# Patient Record
(demographics unavailable — no encounter records)

---

## 2025-06-13 NOTE — DISCUSSION/SUMMARY
[de-identified] : Patient I discussed the underlying etiology recurrent discomfort.  Utilizing a model we reviewed the part anatomy lumbar spine patient was able to appreciate her exiting nerve root pressure of the lumbar spine through combination of disc bulging and facet arthritis can cause pressure on the nerve in similar symptoms to her complaints.  We talked about how this is usually self-limiting but noted help speed up the rate of recovery patient and I have decided to use a Medrol Dosepak reasonable risk and benefits of medication were discussed in detail including potential side effects we also reviewed the patient's current medication profile appears to be no relative current contraindication to his limited use.  Patient also be given a appointment to see our pain management specialist in 7 to 10 days if not thoroughly improved for evaluation of possible epidural injection.  This consultation was 30 minutes exclusive teaching time and separately billed procedures.

## 2025-06-13 NOTE — PHYSICAL EXAM
[de-identified] : Patient is full passive internal/external rotation of left hip with no restriction cam walker pain.

## 2025-06-13 NOTE — HISTORY OF PRESENT ILLNESS
[de-identified] : Patient that I have not seen in 5 years returns today with a complaint of pain in the lower back the radiating pain into her hip and thigh.  She had previously been seen about 5 years ago for similar findings of lumbar radiculopathy did well with a steroid taper.  Patient states ongoing for about 2 months recalls no specific accident injury or initiating traumatic event currently taking no medications denies any change in bowel or bladder habits.  She also denies any obvious weakness or numb  Patient also states has been going to physical therapy for this issue twice a week for about 5 weeks with no improvement.ness in left lower extremity or right lower extremity.